# Patient Record
Sex: MALE | Race: WHITE | NOT HISPANIC OR LATINO | Employment: UNEMPLOYED | URBAN - METROPOLITAN AREA
[De-identification: names, ages, dates, MRNs, and addresses within clinical notes are randomized per-mention and may not be internally consistent; named-entity substitution may affect disease eponyms.]

---

## 2021-04-22 ENCOUNTER — HOSPITAL ENCOUNTER (OUTPATIENT)
Dept: CARDIOLOGY | Facility: HOSPITAL | Age: 36
Discharge: HOME | End: 2021-04-22
Admitting: ORTHOPAEDIC SURGERY

## 2021-04-22 ENCOUNTER — TRANSCRIBE ORDERS (OUTPATIENT)
Dept: ADMINISTRATIVE | Facility: HOSPITAL | Age: 36
End: 2021-04-22

## 2021-04-22 DIAGNOSIS — I34.1 MITRAL VALVE PROLAPSE: Primary | ICD-10-CM

## 2021-04-22 DIAGNOSIS — F17.200 TOBACCO USE DISORDER: ICD-10-CM

## 2021-04-22 DIAGNOSIS — I34.1 MITRAL VALVE PROLAPSE: ICD-10-CM

## 2021-04-22 LAB — QT INTERVAL: 359 MS

## 2021-04-22 PROCEDURE — 93010 ELECTROCARDIOGRAM REPORT: CPT | Performed by: INTERNAL MEDICINE

## 2021-04-22 PROCEDURE — 93005 ELECTROCARDIOGRAM TRACING: CPT | Performed by: ORTHOPAEDIC SURGERY

## 2022-03-16 ENCOUNTER — OFFICE (OUTPATIENT)
Dept: URBAN - METROPOLITAN AREA CLINIC 75 | Facility: CLINIC | Age: 37
End: 2022-03-16

## 2022-03-16 VITALS
DIASTOLIC BLOOD PRESSURE: 72 MMHG | SYSTOLIC BLOOD PRESSURE: 128 MMHG | WEIGHT: 165 LBS | OXYGEN SATURATION: 99 % | HEIGHT: 70 IN | HEART RATE: 114 BPM

## 2022-03-16 DIAGNOSIS — R10.13 EPIGASTRIC PAIN: ICD-10-CM

## 2022-03-16 DIAGNOSIS — R11.0 NAUSEA: ICD-10-CM

## 2022-03-16 PROCEDURE — 99204 OFFICE O/P NEW MOD 45 MIN: CPT | Performed by: INTERNAL MEDICINE

## 2022-04-21 VITALS
HEIGHT: 66 IN | DIASTOLIC BLOOD PRESSURE: 73 MMHG | HEART RATE: 97 BPM | SYSTOLIC BLOOD PRESSURE: 123 MMHG | DIASTOLIC BLOOD PRESSURE: 78 MMHG | SYSTOLIC BLOOD PRESSURE: 133 MMHG | DIASTOLIC BLOOD PRESSURE: 85 MMHG | HEART RATE: 82 BPM | TEMPERATURE: 98.1 F | DIASTOLIC BLOOD PRESSURE: 80 MMHG | HEART RATE: 92 BPM | OXYGEN SATURATION: 99 % | OXYGEN SATURATION: 100 % | OXYGEN SATURATION: 97 % | RESPIRATION RATE: 13 BRPM | SYSTOLIC BLOOD PRESSURE: 134 MMHG | HEART RATE: 91 BPM | HEART RATE: 89 BPM | SYSTOLIC BLOOD PRESSURE: 129 MMHG | SYSTOLIC BLOOD PRESSURE: 117 MMHG | DIASTOLIC BLOOD PRESSURE: 94 MMHG | RESPIRATION RATE: 20 BRPM | RESPIRATION RATE: 29 BRPM | RESPIRATION RATE: 18 BRPM | WEIGHT: 165 LBS | OXYGEN SATURATION: 98 % | DIASTOLIC BLOOD PRESSURE: 65 MMHG | SYSTOLIC BLOOD PRESSURE: 162 MMHG

## 2022-04-22 ENCOUNTER — OFFICE (OUTPATIENT)
Dept: URBAN - METROPOLITAN AREA PATHOLOGY 4 | Facility: PATHOLOGY | Age: 37
End: 2022-04-22

## 2022-04-22 ENCOUNTER — AMBULATORY SURGICAL CENTER (OUTPATIENT)
Dept: URBAN - METROPOLITAN AREA SURGERY 17 | Facility: SURGERY | Age: 37
End: 2022-04-22

## 2022-04-22 DIAGNOSIS — K31.89 OTHER DISEASES OF STOMACH AND DUODENUM: ICD-10-CM

## 2022-04-22 DIAGNOSIS — R11.0 NAUSEA: ICD-10-CM

## 2022-04-22 DIAGNOSIS — R10.13 EPIGASTRIC PAIN: ICD-10-CM

## 2022-04-22 LAB
GI HISTOLOGY: A. UNSPECIFIED: (no result)
GI HISTOLOGY: B. UNSPECIFIED: (no result)
GI HISTOLOGY: C. SELECT: (no result)
GI HISTOLOGY: PDF REPORT: (no result)

## 2022-04-22 PROCEDURE — 43239 EGD BIOPSY SINGLE/MULTIPLE: CPT | Performed by: INTERNAL MEDICINE

## 2022-04-22 PROCEDURE — 88305 TISSUE EXAM BY PATHOLOGIST: CPT | Performed by: INTERNAL MEDICINE

## 2022-04-22 PROCEDURE — 88342 IMHCHEM/IMCYTCHM 1ST ANTB: CPT | Performed by: INTERNAL MEDICINE

## 2022-04-22 NOTE — SERVICEHPINOTES
Pt is a 37 year old male here today for evaluation of nausea. He reports in January he presented to his PCP for persistent nausea and was started on Pantoprazole 40mg PO QD. He states this did help minimize his nausea. He admits to going 2 days without it and his nausea returned. There is no associated vomiting. He endorses epigastric burning at time, but states most he just feels uncomfortable. He admits to only rare reflux occurring less than weekly. No routine use of NSAIDS. No dysphagia.He has a BM daily. It is soft and formed. There is no blood in his stool. His father was diagnosed with bile duct cancer at age 84, no family history of CN cancer. He does have a person history of testicular cancer. br

## 2022-09-21 ENCOUNTER — OFFICE (OUTPATIENT)
Dept: URBAN - METROPOLITAN AREA CLINIC 75 | Facility: CLINIC | Age: 37
End: 2022-09-21

## 2022-09-21 VITALS
WEIGHT: 162 LBS | HEART RATE: 111 BPM | OXYGEN SATURATION: 100 % | HEIGHT: 66 IN | DIASTOLIC BLOOD PRESSURE: 91 MMHG | SYSTOLIC BLOOD PRESSURE: 126 MMHG

## 2022-09-21 DIAGNOSIS — R10.13 EPIGASTRIC PAIN: ICD-10-CM

## 2022-09-21 DIAGNOSIS — R11.0 NAUSEA: ICD-10-CM

## 2022-09-21 DIAGNOSIS — K29.70 GASTRITIS, UNSPECIFIED, WITHOUT BLEEDING: ICD-10-CM

## 2022-09-21 PROCEDURE — 99213 OFFICE O/P EST LOW 20 MIN: CPT | Performed by: INTERNAL MEDICINE

## 2022-09-21 RX ORDER — PANTOPRAZOLE 40 MG/1
TABLET, DELAYED RELEASE ORAL
Qty: 90 | Refills: 3 | Status: ACTIVE

## 2023-03-22 ENCOUNTER — OFFICE (OUTPATIENT)
Dept: URBAN - METROPOLITAN AREA CLINIC 76 | Facility: CLINIC | Age: 38
End: 2023-03-22

## 2023-03-22 VITALS
WEIGHT: 172 LBS | HEART RATE: 88 BPM | HEIGHT: 66 IN | SYSTOLIC BLOOD PRESSURE: 125 MMHG | DIASTOLIC BLOOD PRESSURE: 74 MMHG

## 2023-03-22 DIAGNOSIS — R11.0 NAUSEA: ICD-10-CM

## 2023-03-22 DIAGNOSIS — R10.13 EPIGASTRIC PAIN: ICD-10-CM

## 2023-03-22 DIAGNOSIS — K31.89 OTHER DISEASES OF STOMACH AND DUODENUM: ICD-10-CM

## 2023-03-22 PROCEDURE — 99214 OFFICE O/P EST MOD 30 MIN: CPT

## 2024-02-05 RX ORDER — ROSUVASTATIN CALCIUM 20 MG/1
TABLET, COATED ORAL
COMMUNITY
End: 2024-02-06

## 2024-02-05 RX ORDER — ONDANSETRON 8 MG/1
TABLET, ORALLY DISINTEGRATING ORAL
COMMUNITY

## 2024-02-05 RX ORDER — PANTOPRAZOLE SODIUM 40 MG/1
1 TABLET, DELAYED RELEASE ORAL DAILY
COMMUNITY

## 2024-02-05 RX ORDER — OFLOXACIN 3 MG/ML
SOLUTION/ DROPS OPHTHALMIC
COMMUNITY
End: 2024-02-06

## 2024-02-05 RX ORDER — TRIAMCINOLONE ACETONIDE 55 UG/1
SPRAY, METERED NASAL
COMMUNITY

## 2024-02-05 RX ORDER — GUANFACINE 2 MG/1
TABLET, EXTENDED RELEASE ORAL DAILY
COMMUNITY

## 2024-02-05 RX ORDER — PREDNISONE 20 MG/1
TABLET ORAL
COMMUNITY
End: 2024-02-06

## 2024-02-05 RX ORDER — DESONIDE 0.5 MG/G
CREAM TOPICAL
COMMUNITY

## 2024-02-05 RX ORDER — ACETAMINOPHEN 325 MG/1
TABLET ORAL
COMMUNITY

## 2024-02-05 RX ORDER — FLUTICASONE FUROATE AND VILANTEROL 200; 25 UG/1; UG/1
1 POWDER RESPIRATORY (INHALATION) DAILY
COMMUNITY

## 2024-02-05 RX ORDER — LEVOFLOXACIN 500 MG/1
TABLET, FILM COATED ORAL
COMMUNITY
End: 2024-02-06

## 2024-02-05 RX ORDER — ATOMOXETINE 80 MG/1
1 CAPSULE ORAL EVERY MORNING
COMMUNITY
End: 2024-02-06

## 2024-02-05 RX ORDER — EPINEPHRINE 0.3 MG/.3ML
INJECTION SUBCUTANEOUS AS NEEDED
COMMUNITY

## 2024-02-05 RX ORDER — ALBUTEROL SULFATE 90 UG/1
AEROSOL, METERED RESPIRATORY (INHALATION)
COMMUNITY

## 2024-02-05 RX ORDER — LAMOTRIGINE 100 MG/1
1 TABLET ORAL 2 TIMES DAILY
COMMUNITY

## 2024-02-05 RX ORDER — CICLOPIROX 1 G/100ML
SHAMPOO TOPICAL
COMMUNITY
End: 2024-02-06

## 2024-02-05 RX ORDER — ARIPIPRAZOLE 2 MG/1
TABLET ORAL
COMMUNITY
End: 2024-02-06

## 2024-02-05 RX ORDER — CLONAZEPAM 1 MG/1
.5-1 TABLET ORAL 2 TIMES DAILY PRN
COMMUNITY

## 2024-02-05 RX ORDER — MOLNUPIRAVIR 200 MG/1
CAPSULE ORAL
COMMUNITY
End: 2024-02-06

## 2024-02-05 RX ORDER — VALACYCLOVIR HYDROCHLORIDE 500 MG/1
TABLET, FILM COATED ORAL
COMMUNITY
Start: 2022-06-22

## 2024-02-05 RX ORDER — IBUPROFEN 200 MG
TABLET ORAL
COMMUNITY

## 2024-02-05 RX ORDER — DEXTROAMPHETAMINE SACCHARATE, AMPHETAMINE ASPARTATE, DEXTROAMPHETAMINE SULFATE AND AMPHETAMINE SULFATE 5; 5; 5; 5 MG/1; MG/1; MG/1; MG/1
TABLET ORAL
COMMUNITY
Start: 2023-09-14

## 2024-02-06 ENCOUNTER — OFFICE VISIT (OUTPATIENT)
Age: 39
End: 2024-02-06
Payer: COMMERCIAL

## 2024-02-06 VITALS
WEIGHT: 179 LBS | HEIGHT: 67 IN | BODY MASS INDEX: 28.09 KG/M2 | HEART RATE: 107 BPM | OXYGEN SATURATION: 98 % | SYSTOLIC BLOOD PRESSURE: 136 MMHG | DIASTOLIC BLOOD PRESSURE: 78 MMHG

## 2024-02-06 DIAGNOSIS — E78.5 HYPERLIPIDEMIA LDL GOAL <100: ICD-10-CM

## 2024-02-06 DIAGNOSIS — R53.83 EASY FATIGABILITY: Primary | ICD-10-CM

## 2024-02-06 DIAGNOSIS — Z82.49 FAMILY HISTORY OF EARLY CAD: ICD-10-CM

## 2024-02-06 PROCEDURE — 99204 OFFICE O/P NEW MOD 45 MIN: CPT | Performed by: STUDENT IN AN ORGANIZED HEALTH CARE EDUCATION/TRAINING PROGRAM

## 2024-02-06 PROCEDURE — 93000 ELECTROCARDIOGRAM COMPLETE: CPT | Performed by: STUDENT IN AN ORGANIZED HEALTH CARE EDUCATION/TRAINING PROGRAM

## 2024-02-06 NOTE — PROGRESS NOTES
Subjective:     Encounter Date:02/06/2024      Patient ID: Huey Stewart is a 38 y.o. male.    Chief Complaint:  Establish care    HPI:   38 y.o. male with hyperlipidemia, family history of early CAD who presents to establish care.  Patient and his family have been concerned over his possible cardiac history given his extensive family history of early CAD (see below.)  He notes that he had a recent GI infection and his LFTs and CK were elevated at that time.  He also notes intolerance to statin with CK elevation in the past.  More recently, he has started to exercise a bit more.  He goes to the gym about 3 days a week.  He does 5 to 10 minutes of cardio and then starts to lift.  He does note that he does not feel like he can get through all of his exercises due to not being able to catch his breath.  He also feels like it takes him a pretty long time to recover and that he feels exhausted postexercise.  His most recent lipid panel: Total cholesterol 247, triglycerides 303, HDL 47, .      Father- MI at age 48  Uncles- MIs in his 50s  Grandfather- MI in his 50s  Maternal grandmother- MI      The following portions of the patient's history were reviewed and updated as appropriate: allergies, current medications, past family history, past medical history, past social history, past surgical history and problem list.     REVIEW OF SYSTEMS:   All systems reviewed.  Pertinent positives identified in HPI.  All other systems are negative.    Past Medical History:   Diagnosis Date    ADHD (attention deficit hyperactivity disorder)     Anxiety     Asthma     Bipolar 1 disorder     Cancer     Mixed hyperglyceridemia     Prediabetes        Family History   Problem Relation Age of Onset    Heart attack Father     Hypertension Brother     Heart attack Paternal Uncle        Social History     Socioeconomic History    Marital status: Single   Tobacco Use    Smoking status: Former     Types: Cigarettes     Quit  date:      Years since quittin.1    Tobacco comments:     Caffeine 2 Cups/Day   Vaping Use    Vaping Use: Some days   Substance and Sexual Activity    Alcohol use: Yes     Alcohol/week: 3.0 standard drinks of alcohol     Types: 3 Cans of beer per week     Comment: Weekly    Drug use: Never       Allergies   Allergen Reactions    Tree Nut Anaphylaxis    Nuts Unknown - Low Severity    Peanut Oil Unknown - Low Severity       Past Surgical History:   Procedure Laterality Date    CARPAL TUNNEL RELEASE           ECG 12 Lead    Date/Time: 2024 11:40 AM  Performed by: Dakota aFrmer MD    Authorized by: Dakota Farmer MD  Comparison: compared with previous ECG from 2021  Comparison to previous ECG: Rate has increased  Rhythm: sinus tachycardia  Rate: tachycardic  Conduction: conduction normal  QRS axis: normal  Other findings: left ventricular hypertrophy    Clinical impression: non-specific ECG             Objective:         Vitals:    24 0948   BP: 136/78   Pulse: 107   SpO2: 98%       PHYSICAL EXAM:  GEN: well appearing, in NAD   HEENT: NCAT, EOMI, moist mucus membranes   Respiratory: CTAB, no wheezes, rales or rhonchi  CV: normal rate, regular rhythm, normal S1, S2, no murmurs, rubs, gallops, +2 radial pulses b/l  GI: soft, nontender, nondistended  MSK: no edema  Skin: no rash, warm, dry  Heme/Lymph: no bruising or bleeding  Neuro: Alert and Oriented x 3, grossly normal motor function        Assessment:         (R53.83) Easy fatigability - Plan: Stress Test With Myocardial Perfusion One Day    (Z82.49) Family history of early CAD - Plan: Stress Test With Myocardial Perfusion One Day    (E78.5) Hyperlipidemia LDL goal <100 - Plan: Stress Test With Myocardial Perfusion One Day       Plan:       #FH of early CAD/easy fatigability/[facility]  Patient's cholesterol is elevated and although his ASCVD is low risk at 2.1%, this does not take into account other risk factors including ethnicity and significant FH of  early CAD.  He is also having symptoms that may be concerning for CAD particularly dyspnea on exertion, easy fatigability.  We will pursue ischemic evaluation with a nuclear stress test and if abnormal he is agreeable to left heart catheterization.  If nuclear stress test is normal, we will likely consider CT calcium score to better assess risk.  - nuclear stress test         Dakota Corcoran MD, [facility]  02/06/24  [facility]    Outpatient Encounter Medications as of 2/6/2024   Medication Sig Dispense Refill    acetaminophen (Tylenol) 325 MG tablet Take by oral route as needed.      Acetylcysteine (ACETADOTE IV) 2-3 TIMES DAILY      albuterol sulfate  (90 Base) MCG/ACT inhaler 1 puff in the morning and before working out      amphetamine-dextroamphetamine (ADDERALL) 20 MG tablet Take 0.5 tablets as needed by oral route at dinner for 30 days.      clonazePAM (KlonoPIN) 1 MG tablet Take 0.5-1 tablets by mouth 2 (Two) Times a Day As Needed.      desonide (DESOWEN) 0.05 % cream desonide 0.05 % topical cream   APPLY SPARINGLY TO THE FOREHEAD/ FACE TWICE DAILY      EPINEPHrine (EPIPEN) 0.3 MG/0.3ML solution auto-injector injection As Needed.      Fluticasone Furoate-Vilanterol (BREO ELLIPTA) 200-25 MCG/ACT inhaler Inhale 1 puff Daily.      guanFACINE HCl ER 2 MG tablet sustained-release 24 hour Daily.      ibuprofen (ADVIL,MOTRIN) 200 MG tablet Take 1 tablet every 6 hours by oral route as needed.      lamoTRIgine (LaMICtal) 100 MG tablet Take 1 tablet by mouth 2 (Two) Times a Day.      Lumateperone Tosylate 42 MG capsule Take 1 capsule by mouth Daily.      ondansetron ODT (ZOFRAN-ODT) 8 MG disintegrating tablet Place 1 tablet 3 times a day by translingual route as needed.      pantoprazole (PROTONIX) 40 MG EC tablet Take 1 tablet by mouth Daily.      Triamcinolone Acetonide (Nasacort Allergy 24HR) 55 MCG/ACT nasal inhaler Spray 2 sprays every day by intranasal route.      valACYclovir (VALTREX) 500 MG  tablet TAKE 1 TABLET BY MOUTH TWICE DAILY FOR 5 DAYS      [DISCONTINUED] ARIPiprazole (Abilify) 2 MG tablet Take 1 tablet every day by oral route.      [DISCONTINUED] atomoxetine (STRATTERA) 80 MG capsule Take 1 capsule by mouth Every Morning.      [DISCONTINUED] ciclopirox (LOPROX) 1 % shampoo ciclopirox 1 % shampoo   APPLY TOPICALLY 3 TIMES EVERY WEEK      [DISCONTINUED] levoFLOXacin (LEVAQUIN) 500 MG tablet levofloxacin 500 mg tablet      [DISCONTINUED] Molnupiravir (Lagevrio) 200 MG capsule Lagevrio 200 mg capsule (EUA)      [DISCONTINUED] ofloxacin (OCUFLOX) 0.3 % ophthalmic solution ofloxacin 0.3 % eye drops      [DISCONTINUED] predniSONE (DELTASONE) 20 MG tablet prednisone 20 mg tablet      [DISCONTINUED] rosuvastatin (CRESTOR) 20 MG tablet rosuvastatin 20 mg tablet   TAKE 1 TABLET BY MOUTH EVERY DAY AS NEEDED       No facility-administered encounter medications on file as of 2/6/2024.

## 2024-02-20 ENCOUNTER — OFFICE (OUTPATIENT)
Dept: URBAN - METROPOLITAN AREA CLINIC 76 | Facility: CLINIC | Age: 39
End: 2024-02-20

## 2024-02-20 VITALS
DIASTOLIC BLOOD PRESSURE: 80 MMHG | SYSTOLIC BLOOD PRESSURE: 123 MMHG | HEART RATE: 83 BPM | HEIGHT: 66 IN | WEIGHT: 182 LBS

## 2024-02-20 DIAGNOSIS — R11.0 NAUSEA: ICD-10-CM

## 2024-02-20 DIAGNOSIS — R74.8 ABNORMAL LEVELS OF OTHER SERUM ENZYMES: ICD-10-CM

## 2024-02-20 DIAGNOSIS — K21.9 GASTRO-ESOPHAGEAL REFLUX DISEASE WITHOUT ESOPHAGITIS: ICD-10-CM

## 2024-02-20 PROCEDURE — 99214 OFFICE O/P EST MOD 30 MIN: CPT | Performed by: INTERNAL MEDICINE

## 2024-02-20 RX ORDER — PANTOPRAZOLE 40 MG/1
TABLET, DELAYED RELEASE ORAL
Qty: 90 | Refills: 3 | Status: ACTIVE

## 2024-02-21 ENCOUNTER — TELEPHONE (OUTPATIENT)
Dept: CARDIOLOGY | Facility: CLINIC | Age: 39
End: 2024-02-21
Payer: COMMERCIAL

## 2024-02-22 ENCOUNTER — HOSPITAL ENCOUNTER (OUTPATIENT)
Dept: CARDIOLOGY | Facility: HOSPITAL | Age: 39
Discharge: HOME | End: 2024-02-22
Admitting: STUDENT IN AN ORGANIZED HEALTH CARE EDUCATION/TRAINING PROGRAM
Payer: COMMERCIAL

## 2024-02-22 VITALS — BODY MASS INDEX: 28.37 KG/M2 | WEIGHT: 180.78 LBS | HEIGHT: 67 IN

## 2024-02-22 DIAGNOSIS — E78.5 HYPERLIPIDEMIA LDL GOAL <100: ICD-10-CM

## 2024-02-22 DIAGNOSIS — R53.83 EASY FATIGABILITY: ICD-10-CM

## 2024-02-22 DIAGNOSIS — Z82.49 FAMILY HISTORY OF EARLY CAD: ICD-10-CM

## 2024-02-22 LAB
BH CV NUCLEAR PRIOR STUDY: 2
BH CV REST NUCLEAR ISOTOPE DOSE: 10.3 MCI
BH CV STRESS BP STAGE 1: NORMAL
BH CV STRESS BP STAGE 2: NORMAL
BH CV STRESS BP STAGE 3: NORMAL
BH CV STRESS DURATION MIN STAGE 1: 3
BH CV STRESS DURATION MIN STAGE 2: 3
BH CV STRESS DURATION MIN STAGE 3: 3
BH CV STRESS DURATION MIN STAGE 4: 1
BH CV STRESS DURATION SEC STAGE 1: 0
BH CV STRESS DURATION SEC STAGE 2: 0
BH CV STRESS DURATION SEC STAGE 3: 0
BH CV STRESS DURATION SEC STAGE 4: 30
BH CV STRESS GRADE STAGE 1: 10
BH CV STRESS GRADE STAGE 2: 12
BH CV STRESS GRADE STAGE 3: 14
BH CV STRESS GRADE STAGE 4: 16
BH CV STRESS HR STAGE 1: 114
BH CV STRESS HR STAGE 2: 130
BH CV STRESS HR STAGE 3: 152
BH CV STRESS HR STAGE 4: 158
BH CV STRESS METS STAGE 1: 5
BH CV STRESS METS STAGE 2: 7.5
BH CV STRESS METS STAGE 3: 10
BH CV STRESS METS STAGE 4: 12
BH CV STRESS NUCLEAR ISOTOPE DOSE: 34.5 MCI
BH CV STRESS PROTOCOL 1: NORMAL
BH CV STRESS RECOVERY BP: NORMAL MMHG
BH CV STRESS RECOVERY HR: 110 BPM
BH CV STRESS SPEED STAGE 1: 1.7
BH CV STRESS SPEED STAGE 2: 2.5
BH CV STRESS SPEED STAGE 3: 3.4
BH CV STRESS SPEED STAGE 4: 4.2
BH CV STRESS STAGE 1: 1
BH CV STRESS STAGE 2: 2
BH CV STRESS STAGE 3: 3
BH CV STRESS STAGE 4: 4
LV EF NUC BP: 61 %
MAXIMAL PREDICTED HEART RATE: 180 BPM
PERCENT MAX PREDICTED HR: 87.78 %
STRESS BASELINE BP: NORMAL MMHG
STRESS BASELINE HR: 105 BPM
STRESS PERCENT HR: 103 %
STRESS POST ESTIMATED WORKLOAD: 12 METS
STRESS POST EXERCISE DUR MIN: 10 MIN
STRESS POST EXERCISE DUR SEC: 30 SEC
STRESS POST PEAK BP: NORMAL MMHG
STRESS POST PEAK HR: 158 BPM
STRESS TARGET HR: 153 BPM

## 2024-02-22 PROCEDURE — 78452 HT MUSCLE IMAGE SPECT MULT: CPT

## 2024-02-22 PROCEDURE — 0 TECHNETIUM TETROFOSMIN KIT: Performed by: STUDENT IN AN ORGANIZED HEALTH CARE EDUCATION/TRAINING PROGRAM

## 2024-02-22 PROCEDURE — A9502 TC99M TETROFOSMIN: HCPCS | Performed by: STUDENT IN AN ORGANIZED HEALTH CARE EDUCATION/TRAINING PROGRAM

## 2024-02-22 PROCEDURE — 93017 CV STRESS TEST TRACING ONLY: CPT

## 2024-02-22 RX ADMIN — TETROFOSMIN 1 DOSE: 1.38 INJECTION, POWDER, LYOPHILIZED, FOR SOLUTION INTRAVENOUS at 08:08

## 2024-02-22 RX ADMIN — TETROFOSMIN 1 DOSE: 1.38 INJECTION, POWDER, LYOPHILIZED, FOR SOLUTION INTRAVENOUS at 08:55

## 2024-02-26 NOTE — PROGRESS NOTES
Magno Arzate,     Good news, your stress test was normal. Let me know if you have any questions.    Dr Corcoran

## 2024-02-27 DIAGNOSIS — E78.2 MIXED HYPERLIPIDEMIA: Primary | ICD-10-CM

## 2024-02-27 DIAGNOSIS — Z78.9 STATIN INTOLERANCE: ICD-10-CM

## 2024-02-27 DIAGNOSIS — Z82.49 FAMILY HISTORY OF EARLY CAD: ICD-10-CM

## 2024-03-22 ENCOUNTER — HOSPITAL ENCOUNTER (OUTPATIENT)
Facility: HOSPITAL | Age: 39
Discharge: HOME | End: 2024-03-22
Admitting: STUDENT IN AN ORGANIZED HEALTH CARE EDUCATION/TRAINING PROGRAM

## 2024-03-22 DIAGNOSIS — E78.2 MIXED HYPERLIPIDEMIA: ICD-10-CM

## 2024-03-22 DIAGNOSIS — Z82.49 FAMILY HISTORY OF EARLY CAD: ICD-10-CM

## 2024-03-22 DIAGNOSIS — Z78.9 STATIN INTOLERANCE: ICD-10-CM

## 2024-03-22 PROCEDURE — 75571 CT HRT W/O DYE W/CA TEST: CPT

## 2025-03-05 ENCOUNTER — OFFICE (OUTPATIENT)
Dept: URBAN - METROPOLITAN AREA CLINIC 76 | Facility: CLINIC | Age: 40
End: 2025-03-05

## 2025-03-05 VITALS
HEIGHT: 66 IN | HEART RATE: 97 BPM | DIASTOLIC BLOOD PRESSURE: 70 MMHG | OXYGEN SATURATION: 99 % | WEIGHT: 173 LBS | SYSTOLIC BLOOD PRESSURE: 136 MMHG

## 2025-03-05 DIAGNOSIS — K21.9 GASTRO-ESOPHAGEAL REFLUX DISEASE WITHOUT ESOPHAGITIS: ICD-10-CM

## 2025-03-05 DIAGNOSIS — R49.0 DYSPHONIA: ICD-10-CM

## 2025-03-05 DIAGNOSIS — R11.0 NAUSEA: ICD-10-CM

## 2025-03-05 PROCEDURE — 99214 OFFICE O/P EST MOD 30 MIN: CPT | Performed by: INTERNAL MEDICINE

## 2025-03-24 VITALS
OXYGEN SATURATION: 100 % | RESPIRATION RATE: 14 BRPM | RESPIRATION RATE: 20 BRPM | HEART RATE: 90 BPM | SYSTOLIC BLOOD PRESSURE: 145 MMHG | SYSTOLIC BLOOD PRESSURE: 132 MMHG | SYSTOLIC BLOOD PRESSURE: 143 MMHG | DIASTOLIC BLOOD PRESSURE: 85 MMHG | DIASTOLIC BLOOD PRESSURE: 89 MMHG | TEMPERATURE: 98.8 F | DIASTOLIC BLOOD PRESSURE: 84 MMHG | OXYGEN SATURATION: 98 % | RESPIRATION RATE: 22 BRPM | HEART RATE: 93 BPM | SYSTOLIC BLOOD PRESSURE: 155 MMHG | SYSTOLIC BLOOD PRESSURE: 118 MMHG | DIASTOLIC BLOOD PRESSURE: 91 MMHG | DIASTOLIC BLOOD PRESSURE: 75 MMHG | HEART RATE: 88 BPM | RESPIRATION RATE: 27 BRPM | WEIGHT: 173 LBS | RESPIRATION RATE: 15 BRPM | SYSTOLIC BLOOD PRESSURE: 139 MMHG | HEART RATE: 79 BPM | HEIGHT: 66 IN | OXYGEN SATURATION: 99 % | TEMPERATURE: 97.2 F | RESPIRATION RATE: 18 BRPM | HEART RATE: 87 BPM | HEART RATE: 81 BPM

## 2025-03-26 ENCOUNTER — AMBULATORY SURGICAL CENTER (AMBULATORY)
Dept: URBAN - METROPOLITAN AREA SURGERY 17 | Facility: SURGERY | Age: 40
End: 2025-03-26

## 2025-03-26 ENCOUNTER — OFFICE (AMBULATORY)
Dept: URBAN - METROPOLITAN AREA PATHOLOGY 4 | Facility: PATHOLOGY | Age: 40
End: 2025-03-26

## 2025-03-26 DIAGNOSIS — K21.9 GASTRO-ESOPHAGEAL REFLUX DISEASE WITHOUT ESOPHAGITIS: ICD-10-CM

## 2025-03-26 DIAGNOSIS — K31.89 OTHER DISEASES OF STOMACH AND DUODENUM: ICD-10-CM

## 2025-03-26 LAB
GI HISTOLOGY: A. STOMACH ANTRUM: (no result)
GI HISTOLOGY: B. LOWER THIRD OF THE ESOPHAGUS: (no result)
GI HISTOLOGY: C. MIDDLE THIRD OF THE ESOPHAGUS: (no result)
GI HISTOLOGY: D. UPPER THIRD OF THE ESOPHAGUS: (no result)
GI HISTOLOGY: PDF REPORT: (no result)

## 2025-03-26 PROCEDURE — 88305 TISSUE EXAM BY PATHOLOGIST: CPT | Performed by: PATHOLOGY

## 2025-03-26 PROCEDURE — 43239 EGD BIOPSY SINGLE/MULTIPLE: CPT | Performed by: INTERNAL MEDICINE

## 2025-03-26 RX ORDER — VONOPRAZAN FUMARATE 13.36 MG/1
10 TABLET ORAL
Qty: 30 | Refills: 3 | Status: ACTIVE
Start: 2025-03-26

## 2025-06-24 ENCOUNTER — OFFICE (AMBULATORY)
Dept: URBAN - METROPOLITAN AREA CLINIC 76 | Facility: CLINIC | Age: 40
End: 2025-06-24
Payer: COMMERCIAL

## 2025-06-24 VITALS
SYSTOLIC BLOOD PRESSURE: 130 MMHG | WEIGHT: 173 LBS | DIASTOLIC BLOOD PRESSURE: 78 MMHG | OXYGEN SATURATION: 99 % | HEART RATE: 92 BPM | HEIGHT: 66 IN

## 2025-06-24 DIAGNOSIS — K21.9 GASTRO-ESOPHAGEAL REFLUX DISEASE WITHOUT ESOPHAGITIS: ICD-10-CM

## 2025-06-24 DIAGNOSIS — R11.0 NAUSEA: ICD-10-CM

## 2025-06-24 PROCEDURE — 99214 OFFICE O/P EST MOD 30 MIN: CPT
